# Patient Record
Sex: MALE | URBAN - METROPOLITAN AREA
[De-identification: names, ages, dates, MRNs, and addresses within clinical notes are randomized per-mention and may not be internally consistent; named-entity substitution may affect disease eponyms.]

---

## 2021-04-22 ENCOUNTER — APPOINTMENT (RX ONLY)
Dept: URBAN - METROPOLITAN AREA CLINIC 43 | Facility: CLINIC | Age: 51
Setting detail: DERMATOLOGY
End: 2021-04-22

## 2021-04-22 DIAGNOSIS — L81.1 CHLOASMA: ICD-10-CM

## 2021-04-22 DIAGNOSIS — B35.3 TINEA PEDIS: ICD-10-CM

## 2021-04-22 DIAGNOSIS — L30.4 ERYTHEMA INTERTRIGO: ICD-10-CM

## 2021-04-22 DIAGNOSIS — L85.8 OTHER SPECIFIED EPIDERMAL THICKENING: ICD-10-CM

## 2021-04-22 PROCEDURE — ? TREATMENT REGIMEN

## 2021-04-22 PROCEDURE — ? ADDITIONAL NOTES

## 2021-04-22 PROCEDURE — ? PRESCRIPTION

## 2021-04-22 PROCEDURE — 99203 OFFICE O/P NEW LOW 30 MIN: CPT

## 2021-04-22 PROCEDURE — ? COUNSELING

## 2021-04-22 RX ORDER — ECONAZOLE NITRATE 10 MG/G
CREAM TOPICAL BID
Qty: 1 | Refills: 1 | Status: ERX | COMMUNITY
Start: 2021-04-22

## 2021-04-22 RX ORDER — HYDROQUINONE 4 %
CREAM (GRAM) TOPICAL BID
Qty: 1 | Refills: 1 | Status: ERX | COMMUNITY
Start: 2021-04-22

## 2021-04-22 RX ORDER — DESONIDE 0.5 MG/G
CREAM TOPICAL BID
Qty: 1 | Refills: 3 | Status: ERX | COMMUNITY
Start: 2021-04-22

## 2021-04-22 RX ADMIN — DESONIDE: 0.5 CREAM TOPICAL at 00:00

## 2021-04-22 RX ADMIN — ECONAZOLE NITRATE: 10 CREAM TOPICAL at 00:00

## 2021-04-22 RX ADMIN — Medication: at 00:00

## 2021-04-22 ASSESSMENT — LOCATION SIMPLE DESCRIPTION DERM
LOCATION SIMPLE: RIGHT CHEEK
LOCATION SIMPLE: LEFT THIGH
LOCATION SIMPLE: LEFT PLANTAR SURFACE
LOCATION SIMPLE: LEFT GREAT TOE
LOCATION SIMPLE: LEFT CHEEK

## 2021-04-22 ASSESSMENT — LOCATION DETAILED DESCRIPTION DERM
LOCATION DETAILED: LEFT DORSAL GREAT TOE
LOCATION DETAILED: LEFT ANTERIOR PROXIMAL THIGH
LOCATION DETAILED: RIGHT CENTRAL MALAR CHEEK
LOCATION DETAILED: LEFT CENTRAL MALAR CHEEK
LOCATION DETAILED: LEFT MEDIAL PLANTAR HEEL

## 2021-04-22 ASSESSMENT — LOCATION ZONE DERM
LOCATION ZONE: FEET
LOCATION ZONE: LEG
LOCATION ZONE: TOE
LOCATION ZONE: FACE

## 2021-04-22 NOTE — PROCEDURE: MIPS QUALITY
Additional Notes: Have you tested positive for COVID19 or been diagnosed with COVID19? No\\nAre you waiting for pending test results for COVID19? No\\nDo you have any of the following symptoms: fever, cough, difficulty breathing, muscle aches/soreness, loss of smell or taste, sore throat, or nausea/diarrhea? No\\nHave you been in close contact with anyone who has COVID19? No\\nHave you traveled outside of the [place] or to [place], [place], [place], [place], [place], [place], or [place] in the last 14 days? No\\nIs there any reason you cannot wear a mask that covers your nose and mouth for the entire time that you are in the office? No
Quality 110: Preventive Care And Screening: Influenza Immunization: Influenza Immunization previously received during influenza season
Detail Level: Detailed

## 2021-04-22 NOTE — PROCEDURE: TREATMENT REGIMEN
Initiate Treatment: Will plan urea 40% lotion in future\\nStart vaseline QHS\\nStart treatment for TP first
Detail Level: Zone
Plan: Re-eval at f/u
Plan: Pt took lamisil years ago\\nDoes not want to take oral meds at this time\\nWill address dryness and hyperkeratosis following tinea treatment\\nPt understands this will likely not improve toenails
Initiate Treatment: Eczonaole BID x 4 weeks
Initiate Treatment: Absorbent powders daily\\nDesonide prn rash\\nLoose clothing\\nPt sits for work, is a \\nTake breaks and walk around\\nF/u 4 weeks

## 2021-04-22 NOTE — PROCEDURE: ADDITIONAL NOTES
Render Risk Assessment In Note?: no
Additional Notes: Pt is a  so is outside everyday \\nSide effects of hydroquinone discussed \\nAdvised everyday SPF 50 \\nNeutrogena healthy defense SPF 50 samples given
Detail Level: Simple